# Patient Record
Sex: FEMALE | Race: WHITE
[De-identification: names, ages, dates, MRNs, and addresses within clinical notes are randomized per-mention and may not be internally consistent; named-entity substitution may affect disease eponyms.]

---

## 2019-08-07 ENCOUNTER — HOSPITAL ENCOUNTER (OUTPATIENT)
Dept: HOSPITAL 74 - JINFUSION | Age: 34
Discharge: HOME | End: 2019-08-07
Attending: OBSTETRICS & GYNECOLOGY
Payer: COMMERCIAL

## 2019-08-07 VITALS — SYSTOLIC BLOOD PRESSURE: 108 MMHG | DIASTOLIC BLOOD PRESSURE: 69 MMHG | HEART RATE: 75 BPM

## 2019-08-07 VITALS — TEMPERATURE: 98.4 F

## 2019-08-07 DIAGNOSIS — Z3A.39: ICD-10-CM

## 2019-08-07 DIAGNOSIS — D50.9: ICD-10-CM

## 2019-08-07 DIAGNOSIS — O99.013: Primary | ICD-10-CM

## 2019-08-07 LAB
DEPRECATED RDW RBC AUTO: 14.6 % (ref 11.6–15.6)
HCT VFR BLD CALC: 30.2 % (ref 32.4–45.2)
HGB BLD-MCNC: 10.1 GM/DL (ref 10.7–15.3)
MCH RBC QN AUTO: 28.3 PG (ref 25.7–33.7)
MCHC RBC AUTO-ENTMCNC: 33.5 G/DL (ref 32–36)
MCV RBC: 84.6 FL (ref 80–96)
PLATELET # BLD AUTO: 231 K/MM3 (ref 134–434)
PMV BLD: 7 FL (ref 7.5–11.1)
RBC # BLD AUTO: 3.57 M/MM3 (ref 3.6–5.2)
WBC # BLD AUTO: 8.2 K/MM3 (ref 4–10)

## 2019-08-07 PROCEDURE — 3E033GC INTRODUCTION OF OTHER THERAPEUTIC SUBSTANCE INTO PERIPHERAL VEIN, PERCUTANEOUS APPROACH: ICD-10-PCS | Performed by: OBSTETRICS & GYNECOLOGY

## 2019-08-11 ENCOUNTER — HOSPITAL ENCOUNTER (INPATIENT)
Dept: HOSPITAL 74 - JDEL | Age: 34
LOS: 2 days | Discharge: HOME | End: 2019-08-13
Payer: COMMERCIAL

## 2019-10-02 NOTE — HP
DATE OF ADMISSION:  2019

 

CHIEF COMPLAINT:  Iron deficiency anemia, 39 weeks pregnant.

 

HISTORY OF PRESENT ILLNESS:  Patient is a 33-year-old  female at 39 weeks

gestation who was diagnosed with iron deficiency anemia in the office.  The patient

did not tolerate oral iron and elected to undergo iron infusion therapy.

 

PAST MEDICAL HISTORY:  Insignificant except for iron deficiency anemia and pregnancy.

 Estimated due date of pregnancy is 2019.

 

PRIOR SURGICAL HISTORY:  None.

 

SMOKING HISTORY:  None.

 

ALCOHOL USE:  None.

 

REVIEW OF SYSTEMS:  Negative.

 

ALLERGIES:   STRAWBERRY and PINEAPPLE which give her hives.

 

PHYSICAL EXAMINATION:Vital Signs:  Temperature 98.4, pulse rate 88, blood pressure

107/63, respiratory rate 16.  Weight 213 pounds.

General:  Awake, alert and oriented x3 in no acute distress.

Lungs:  Normal respirations.

Heart:  Regular rate and rhythm.

Abdomen:  Gravid.

 

LABORATORY DATA:  Hemoglobin was noted to be 10.1.

 

ASSESSMENT AND PLAN:  A 33-year-old  1, para 0, 39 weeks gestation in

pregnancy with iron deficiency anemia.  Plan for IV iron infusion per protocol.

 

 

ANA ARORA DO

 

/[id number]

DD: 10/02/2019 10:59

DT: 10/02/2019 11:29

Job #:  [id number]

## 2023-04-12 ENCOUNTER — HOSPITAL ENCOUNTER (EMERGENCY)
Dept: HOSPITAL 74 - JER | Age: 38
Discharge: HOME | End: 2023-04-12
Payer: COMMERCIAL

## 2023-04-12 VITALS — BODY MASS INDEX: 35.5 KG/M2

## 2023-04-12 VITALS
SYSTOLIC BLOOD PRESSURE: 117 MMHG | RESPIRATION RATE: 18 BRPM | TEMPERATURE: 99.1 F | DIASTOLIC BLOOD PRESSURE: 64 MMHG | HEART RATE: 84 BPM

## 2023-04-12 DIAGNOSIS — O98.512: Primary | ICD-10-CM

## 2023-04-12 DIAGNOSIS — R11.2: ICD-10-CM

## 2023-04-12 DIAGNOSIS — A08.4: ICD-10-CM

## 2023-04-12 DIAGNOSIS — K21.9: ICD-10-CM

## 2023-04-12 DIAGNOSIS — Z3A.22: ICD-10-CM

## 2023-04-12 LAB
ALBUMIN SERPL-MCNC: 2.6 G/DL (ref 3.4–5)
ALP SERPL-CCNC: 74 U/L (ref 45–117)
ALT SERPL-CCNC: 17 U/L (ref 13–61)
ANION GAP SERPL CALC-SCNC: 6 MMOL/L (ref 8–16)
APPEARANCE UR: CLEAR
AST SERPL-CCNC: 14 U/L (ref 15–37)
BASOPHILS # BLD: 0.1 % (ref 0–2)
BILIRUB SERPL-MCNC: 0.3 MG/DL (ref 0.2–1)
BILIRUB UR STRIP.AUTO-MCNC: NEGATIVE MG/DL
BUN SERPL-MCNC: 8.5 MG/DL (ref 7–18)
CALCIUM SERPL-MCNC: 8.3 MG/DL (ref 8.5–10.1)
CHLORIDE SERPL-SCNC: 109 MMOL/L (ref 98–107)
CO2 SERPL-SCNC: 23 MMOL/L (ref 21–32)
COLOR UR: (no result)
CREAT SERPL-MCNC: 0.5 MG/DL (ref 0.55–1.3)
DEPRECATED RDW RBC AUTO: 14.2 % (ref 11.6–15.6)
EOSINOPHIL # BLD: 0.6 % (ref 0–4.5)
GLUCOSE SERPL-MCNC: 74 MG/DL (ref 74–106)
HCT VFR BLD CALC: 30 % (ref 32.4–45.2)
HGB BLD-MCNC: 10.2 GM/DL (ref 10.7–15.3)
KETONES UR QL STRIP: (no result)
LEUKOCYTE ESTERASE UR QL STRIP.AUTO: NEGATIVE
LYMPHOCYTES # BLD: 11 % (ref 8–40)
MCH RBC QN AUTO: 27.5 PG (ref 25.7–33.7)
MCHC RBC AUTO-ENTMCNC: 34.2 G/DL (ref 32–36)
MCV RBC: 80.4 FL (ref 80–96)
MONOCYTES # BLD AUTO: 5.7 % (ref 3.8–10.2)
NEUTROPHILS # BLD: 82.6 % (ref 42.8–82.8)
NITRITE UR QL STRIP: NEGATIVE
PH UR: 5.5 [PH] (ref 5–8)
PLATELET # BLD AUTO: 277 10^3/UL (ref 134–434)
PMV BLD: 7.2 FL (ref 7.5–11.1)
PROT SERPL-MCNC: 6.4 G/DL (ref 6.4–8.2)
PROT UR QL STRIP: (no result)
PROT UR QL STRIP: NEGATIVE
RBC # BLD AUTO: 3.72 M/MM3 (ref 3.6–5.2)
SODIUM SERPL-SCNC: 138 MMOL/L (ref 136–145)
SP GR UR: 1.03 (ref 1.01–1.03)
UROBILINOGEN UR STRIP-MCNC: 1 MG/DL (ref 0.2–1)
WBC # BLD AUTO: 9.9 K/MM3 (ref 4–10)

## 2023-04-12 PROCEDURE — 3E033GC INTRODUCTION OF OTHER THERAPEUTIC SUBSTANCE INTO PERIPHERAL VEIN, PERCUTANEOUS APPROACH: ICD-10-PCS

## 2023-04-12 PROCEDURE — 3E0337Z INTRODUCTION OF ELECTROLYTIC AND WATER BALANCE SUBSTANCE INTO PERIPHERAL VEIN, PERCUTANEOUS APPROACH: ICD-10-PCS

## 2023-08-22 ENCOUNTER — HOSPITAL ENCOUNTER (INPATIENT)
Dept: HOSPITAL 74 - JLDR | Age: 38
LOS: 4 days | Discharge: HOME | DRG: 560 | End: 2023-08-26
Attending: OBSTETRICS & GYNECOLOGY | Admitting: OBSTETRICS & GYNECOLOGY
Payer: COMMERCIAL

## 2023-08-22 VITALS — BODY MASS INDEX: 35.8 KG/M2

## 2023-08-22 DIAGNOSIS — Z3A.39: ICD-10-CM

## 2023-08-22 DIAGNOSIS — R05.9: ICD-10-CM

## 2023-08-22 DIAGNOSIS — O41.03X0: Primary | ICD-10-CM

## 2023-08-22 DIAGNOSIS — J45.909: ICD-10-CM

## 2023-08-22 LAB
ANION GAP SERPL CALC-SCNC: 7 MMOL/L (ref 8–16)
APTT BLD: 25.3 SECONDS (ref 25.2–36.5)
BASOPHILS # BLD: 0.8 % (ref 0–2)
BUN SERPL-MCNC: 10.1 MG/DL (ref 7–18)
CALCIUM SERPL-MCNC: 7.8 MG/DL (ref 8.5–10.1)
CHLORIDE SERPL-SCNC: 112 MMOL/L (ref 98–107)
CO2 SERPL-SCNC: 22 MMOL/L (ref 21–32)
CREAT SERPL-MCNC: 0.6 MG/DL (ref 0.55–1.3)
DEPRECATED RDW RBC AUTO: 15.3 % (ref 11.6–15.6)
EOSINOPHIL # BLD: 1.3 % (ref 0–4.5)
GLUCOSE SERPL-MCNC: 84 MG/DL (ref 74–106)
HCT VFR BLD CALC: 24.1 % (ref 32.4–45.2)
HGB BLD-MCNC: 7.8 GM/DL (ref 10.7–15.3)
INR BLD: 0.92 (ref 0.83–1.09)
LYMPHOCYTES # BLD: 20.3 % (ref 8–40)
MCH RBC QN AUTO: 23.7 PG (ref 25.7–33.7)
MCHC RBC AUTO-ENTMCNC: 32.2 G/DL (ref 32–36)
MCV RBC: 73.5 FL (ref 80–96)
MONOCYTES # BLD AUTO: 7.4 % (ref 3.8–10.2)
NEUTROPHILS # BLD: 70.2 % (ref 42.8–82.8)
PLATELET # BLD AUTO: 259 10^3/UL (ref 134–434)
PMV BLD: 6.8 FL (ref 7.5–11.1)
POTASSIUM SERPLBLD-SCNC: 3.9 MMOL/L (ref 3.5–5.1)
PT PNL PPP: 10.7 SEC (ref 9.7–13)
RBC # BLD AUTO: 3.28 M/MM3 (ref 3.6–5.2)
SODIUM SERPL-SCNC: 141 MMOL/L (ref 136–145)
WBC # BLD AUTO: 8.5 K/MM3 (ref 4–10)

## 2023-08-22 PROCEDURE — P9058 RBC, L/R, CMV-NEG, IRRAD: HCPCS

## 2023-08-23 LAB
BASE EXCESS BLDCOA CALC-SCNC: -5.6 MMOL/L (ref 0–2)
BASE EXCESS BLDCOA CALC-SCNC: -5.7 MMOL/L (ref 0–2)
HCO3 BLDCO-SCNC: 19.4 MMHG (ref 20–29)
HCO3 BLDCO-SCNC: 20 MMHG (ref 20–29)
PCO2 BLDCO: 37.5 MMHG (ref 30–78)
PCO2 BLDCO: 39.9 MMHG (ref 30–78)
PH BLDCO: 7.32 [PH] (ref 7.14–7.44)
PH BLDCO: 7.33 [PH] (ref 7.14–7.44)

## 2023-08-23 RX ADMIN — FERROUS SULFATE TAB EC 324 MG (65 MG FE EQUIVALENT) SCH MG: 324 (65 FE) TABLET DELAYED RESPONSE at 08:21

## 2023-08-23 RX ADMIN — ACETAMINOPHEN PRN MG: 325 TABLET ORAL at 05:19

## 2023-08-23 RX ADMIN — Medication SCH TAB: at 09:28

## 2023-08-23 RX ADMIN — FERROUS SULFATE TAB EC 324 MG (65 MG FE EQUIVALENT) SCH MG: 324 (65 FE) TABLET DELAYED RESPONSE at 18:27

## 2023-08-23 RX ADMIN — FLUTICASONE PROPIONATE SCH SPRAY: 50 SPRAY, METERED NASAL at 16:41

## 2023-08-23 RX ADMIN — LORATADINE SCH MG: 10 TABLET ORAL at 14:26

## 2023-08-23 RX ADMIN — FERROUS SULFATE TAB EC 324 MG (65 MG FE EQUIVALENT) SCH MG: 324 (65 FE) TABLET DELAYED RESPONSE at 12:25

## 2023-08-24 LAB
BASOPHILS # BLD: 0.2 % (ref 0–2)
DEPRECATED RDW RBC AUTO: 15.1 % (ref 11.6–15.6)
EOSINOPHIL # BLD: 1.5 % (ref 0–4.5)
HCT VFR BLD CALC: 21.1 % (ref 32.4–45.2)
HGB BLD-MCNC: 6.7 GM/DL (ref 10.7–15.3)
LYMPHOCYTES # BLD: 27 % (ref 8–40)
MCH RBC QN AUTO: 23.6 PG (ref 25.7–33.7)
MCHC RBC AUTO-ENTMCNC: 31.5 G/DL (ref 32–36)
MCV RBC: 75.1 FL (ref 80–96)
MONOCYTES # BLD AUTO: 7.7 % (ref 3.8–10.2)
NEUTROPHILS # BLD: 63.6 % (ref 42.8–82.8)
PLATELET # BLD AUTO: 242 10^3/UL (ref 134–434)
PMV BLD: 7.2 FL (ref 7.5–11.1)
RBC # BLD AUTO: 2.82 M/MM3 (ref 3.6–5.2)
WBC # BLD AUTO: 11.2 K/MM3 (ref 4–10)

## 2023-08-24 PROCEDURE — 30233N1 TRANSFUSION OF NONAUTOLOGOUS RED BLOOD CELLS INTO PERIPHERAL VEIN, PERCUTANEOUS APPROACH: ICD-10-PCS | Performed by: OBSTETRICS & GYNECOLOGY

## 2023-08-24 RX ADMIN — Medication SCH TAB: at 09:33

## 2023-08-24 RX ADMIN — FERROUS SULFATE TAB EC 324 MG (65 MG FE EQUIVALENT) SCH MG: 324 (65 FE) TABLET DELAYED RESPONSE at 18:07

## 2023-08-24 RX ADMIN — SALINE NASAL SPRAY PRN SPRAYS: 1.5 SOLUTION NASAL at 12:46

## 2023-08-24 RX ADMIN — MONTELUKAST SODIUM SCH MG: 10 TABLET, COATED ORAL at 21:35

## 2023-08-24 RX ADMIN — LORATADINE SCH MG: 10 TABLET ORAL at 09:33

## 2023-08-24 RX ADMIN — PANTOPRAZOLE SODIUM SCH MG: 40 TABLET, DELAYED RELEASE ORAL at 12:46

## 2023-08-24 RX ADMIN — CEFAZOLIN SCH MLS/HR: 2 INJECTION, POWDER, FOR SOLUTION INTRAMUSCULAR; INTRAVENOUS at 18:06

## 2023-08-24 RX ADMIN — FERROUS SULFATE TAB EC 324 MG (65 MG FE EQUIVALENT) SCH MG: 324 (65 FE) TABLET DELAYED RESPONSE at 09:33

## 2023-08-24 RX ADMIN — FLUTICASONE PROPIONATE SCH SPRAY: 50 SPRAY, METERED NASAL at 09:34

## 2023-08-24 RX ADMIN — FERROUS SULFATE TAB EC 324 MG (65 MG FE EQUIVALENT) SCH MG: 324 (65 FE) TABLET DELAYED RESPONSE at 12:18

## 2023-08-24 RX ADMIN — ACETAMINOPHEN PRN MG: 325 TABLET ORAL at 12:17

## 2023-08-24 RX ADMIN — PREDNISONE SCH MG: 20 TABLET ORAL at 19:57

## 2023-08-25 LAB
ALBUMIN SERPL-MCNC: 2.2 G/DL (ref 3.4–5)
ALP SERPL-CCNC: 104 U/L (ref 45–117)
ALT SERPL-CCNC: 10 U/L (ref 13–61)
ANION GAP SERPL CALC-SCNC: 8 MMOL/L (ref 8–16)
AST SERPL-CCNC: 12 U/L (ref 15–37)
BASOPHILS # BLD: 0.1 % (ref 0–2)
BILIRUB SERPL-MCNC: 0.2 MG/DL (ref 0.2–1)
BUN SERPL-MCNC: 7.6 MG/DL (ref 7–18)
CALCIUM SERPL-MCNC: 8.4 MG/DL (ref 8.5–10.1)
CHLORIDE SERPL-SCNC: 114 MMOL/L (ref 98–107)
CO2 SERPL-SCNC: 23 MMOL/L (ref 21–32)
CREAT SERPL-MCNC: 0.6 MG/DL (ref 0.55–1.3)
DEPRECATED RDW RBC AUTO: 17.2 % (ref 11.6–15.6)
EOSINOPHIL # BLD: 0 % (ref 0–4.5)
GLUCOSE SERPL-MCNC: 118 MG/DL (ref 74–106)
HCT VFR BLD CALC: 27.9 % (ref 32.4–45.2)
HGB BLD-MCNC: 9 GM/DL (ref 10.7–15.3)
LYMPHOCYTES # BLD: 12.6 % (ref 8–40)
MCH RBC QN AUTO: 24.3 PG (ref 25.7–33.7)
MCHC RBC AUTO-ENTMCNC: 32.3 G/DL (ref 32–36)
MCV RBC: 75.1 FL (ref 80–96)
MONOCYTES # BLD AUTO: 2.9 % (ref 3.8–10.2)
NEUTROPHILS # BLD: 84.4 % (ref 42.8–82.8)
PLATELET # BLD AUTO: 246 10^3/UL (ref 134–434)
PMV BLD: 7.4 FL (ref 7.5–11.1)
POTASSIUM SERPLBLD-SCNC: 4.1 MMOL/L (ref 3.5–5.1)
PROT SERPL-MCNC: 5.4 G/DL (ref 6.4–8.2)
RBC # BLD AUTO: 3.72 M/MM3 (ref 3.6–5.2)
SODIUM SERPL-SCNC: 144 MMOL/L (ref 136–145)
WBC # BLD AUTO: 11.7 K/MM3 (ref 4–10)

## 2023-08-25 RX ADMIN — CEFAZOLIN SCH MLS/HR: 2 INJECTION, POWDER, FOR SOLUTION INTRAMUSCULAR; INTRAVENOUS at 01:40

## 2023-08-25 RX ADMIN — SALINE NASAL SPRAY PRN SPRAYS: 1.5 SOLUTION NASAL at 09:13

## 2023-08-25 RX ADMIN — ACETAMINOPHEN PRN MG: 325 TABLET ORAL at 09:31

## 2023-08-25 RX ADMIN — MONTELUKAST SODIUM SCH MG: 10 TABLET, COATED ORAL at 22:23

## 2023-08-25 RX ADMIN — CEFAZOLIN SCH: 2 INJECTION, POWDER, FOR SOLUTION INTRAMUSCULAR; INTRAVENOUS at 10:09

## 2023-08-25 RX ADMIN — FERROUS SULFATE TAB EC 324 MG (65 MG FE EQUIVALENT) SCH MG: 324 (65 FE) TABLET DELAYED RESPONSE at 17:11

## 2023-08-25 RX ADMIN — FLUTICASONE PROPIONATE SCH SPRAY: 50 SPRAY, METERED NASAL at 09:13

## 2023-08-25 RX ADMIN — CEFAZOLIN SCH MLS/HR: 2 INJECTION, POWDER, FOR SOLUTION INTRAMUSCULAR; INTRAVENOUS at 09:12

## 2023-08-25 RX ADMIN — FERROUS SULFATE TAB EC 324 MG (65 MG FE EQUIVALENT) SCH MG: 324 (65 FE) TABLET DELAYED RESPONSE at 12:50

## 2023-08-25 RX ADMIN — PANTOPRAZOLE SODIUM SCH MG: 40 TABLET, DELAYED RELEASE ORAL at 09:12

## 2023-08-25 RX ADMIN — AMOXICILLIN AND CLAVULANATE POTASSIUM SCH TAB: 500; 125 TABLET, FILM COATED ORAL at 17:11

## 2023-08-25 RX ADMIN — Medication SCH TAB: at 09:12

## 2023-08-25 RX ADMIN — FERROUS SULFATE TAB EC 324 MG (65 MG FE EQUIVALENT) SCH MG: 324 (65 FE) TABLET DELAYED RESPONSE at 09:12

## 2023-08-25 RX ADMIN — PREDNISONE SCH MG: 20 TABLET ORAL at 09:13

## 2023-08-25 RX ADMIN — LORATADINE SCH MG: 10 TABLET ORAL at 09:12

## 2023-08-26 VITALS
SYSTOLIC BLOOD PRESSURE: 134 MMHG | RESPIRATION RATE: 17 BRPM | HEART RATE: 52 BPM | TEMPERATURE: 97.8 F | DIASTOLIC BLOOD PRESSURE: 84 MMHG

## 2023-08-26 LAB
MAGNESIUM SERPL-MCNC: 1.8 MG/DL (ref 1.8–2.4)
PHOSPHATE SERPL-MCNC: 3.7 MG/DL (ref 2.5–4.9)

## 2023-08-26 RX ADMIN — AMOXICILLIN AND CLAVULANATE POTASSIUM SCH TAB: 500; 125 TABLET, FILM COATED ORAL at 10:00

## 2023-08-26 RX ADMIN — FERROUS SULFATE TAB EC 324 MG (65 MG FE EQUIVALENT) SCH: 324 (65 FE) TABLET DELAYED RESPONSE at 12:00

## 2023-08-26 RX ADMIN — Medication SCH TAB: at 11:35

## 2023-08-26 RX ADMIN — ACETAMINOPHEN PRN MG: 325 TABLET ORAL at 05:43

## 2023-08-26 RX ADMIN — FLUTICASONE PROPIONATE SCH SPRAY: 50 SPRAY, METERED NASAL at 10:45

## 2023-08-26 RX ADMIN — FERROUS SULFATE TAB EC 324 MG (65 MG FE EQUIVALENT) SCH MG: 324 (65 FE) TABLET DELAYED RESPONSE at 10:00

## 2023-08-26 RX ADMIN — LORATADINE SCH MG: 10 TABLET ORAL at 11:35

## 2023-08-26 RX ADMIN — PANTOPRAZOLE SODIUM SCH MG: 40 TABLET, DELAYED RELEASE ORAL at 11:35

## 2023-08-26 RX ADMIN — PREDNISONE SCH MG: 20 TABLET ORAL at 11:35
